# Patient Record
Sex: FEMALE | Race: WHITE | NOT HISPANIC OR LATINO | URBAN - METROPOLITAN AREA
[De-identification: names, ages, dates, MRNs, and addresses within clinical notes are randomized per-mention and may not be internally consistent; named-entity substitution may affect disease eponyms.]

---

## 2020-02-04 ENCOUNTER — PROBLEM (OUTPATIENT)
Dept: URBAN - METROPOLITAN AREA CLINIC 79 | Facility: CLINIC | Age: 55
End: 2020-02-04

## 2020-02-04 DIAGNOSIS — B02.39: ICD-10-CM

## 2020-02-04 PROCEDURE — 92012 INTRM OPH EXAM EST PATIENT: CPT

## 2020-02-04 ASSESSMENT — TONOMETRY: OD_IOP_MMHG: 15

## 2020-02-04 ASSESSMENT — VISUAL ACUITY
OD_CC: 20/25+2
OS_CC: 20/25+2

## 2020-06-29 ENCOUNTER — ESTABLISHED COMPREHENSIVE EXAM (OUTPATIENT)
Dept: URBAN - METROPOLITAN AREA CLINIC 79 | Facility: CLINIC | Age: 55
End: 2020-06-29

## 2020-06-29 DIAGNOSIS — H52.13: ICD-10-CM

## 2020-06-29 DIAGNOSIS — D31.31: ICD-10-CM

## 2020-06-29 DIAGNOSIS — H43.813: ICD-10-CM

## 2020-06-29 DIAGNOSIS — Z97.3: ICD-10-CM

## 2020-06-29 DIAGNOSIS — H25.13: ICD-10-CM

## 2020-06-29 PROCEDURE — 92015 DETERMINE REFRACTIVE STATE: CPT

## 2020-06-29 PROCEDURE — 92250 FUNDUS PHOTOGRAPHY W/I&R: CPT

## 2020-06-29 PROCEDURE — 92310 CONTACT LENS FITTING OU: CPT

## 2020-06-29 PROCEDURE — 92014 COMPRE OPH EXAM EST PT 1/>: CPT

## 2020-06-29 ASSESSMENT — VISUAL ACUITY
OD_CC: 20/20
OD_CC: 20/20
OS_CC: 20/20
OS_CC: 20/20

## 2020-06-29 ASSESSMENT — TONOMETRY
OS_IOP_MMHG: 15
OD_IOP_MMHG: 14

## 2021-05-20 ENCOUNTER — PROBLEM (OUTPATIENT)
Dept: URBAN - METROPOLITAN AREA CLINIC 79 | Facility: CLINIC | Age: 56
End: 2021-05-20

## 2021-05-20 VITALS — HEIGHT: 55 IN

## 2021-05-20 DIAGNOSIS — H11.31: ICD-10-CM

## 2021-05-20 PROCEDURE — 99213 OFFICE O/P EST LOW 20 MIN: CPT

## 2021-05-20 ASSESSMENT — VISUAL ACUITY
OD_CC: 20/20-1
OS_CC: 20/20-1

## 2021-05-20 ASSESSMENT — TONOMETRY
OS_IOP_MMHG: 14
OD_IOP_MMHG: 14

## 2021-06-30 ENCOUNTER — ESTABLISHED COMPREHENSIVE EXAM (OUTPATIENT)
Dept: URBAN - METROPOLITAN AREA CLINIC 79 | Facility: CLINIC | Age: 56
End: 2021-06-30

## 2021-06-30 DIAGNOSIS — Z97.3: ICD-10-CM

## 2021-06-30 DIAGNOSIS — H43.813: ICD-10-CM

## 2021-06-30 DIAGNOSIS — H52.13: ICD-10-CM

## 2021-06-30 DIAGNOSIS — H25.13: ICD-10-CM

## 2021-06-30 PROCEDURE — 92250 FUNDUS PHOTOGRAPHY W/I&R: CPT

## 2021-06-30 PROCEDURE — 99214 OFFICE O/P EST MOD 30 MIN: CPT

## 2021-06-30 PROCEDURE — 92310 CONTACT LENS FITTING OU: CPT

## 2021-06-30 PROCEDURE — 92015 DETERMINE REFRACTIVE STATE: CPT

## 2021-06-30 ASSESSMENT — VISUAL ACUITY
OS_CC: 20/20-1
OD_CC: 20/25
OD_CC: 20/20-1
OS_CC: 20/25

## 2021-06-30 ASSESSMENT — TONOMETRY
OD_IOP_MMHG: 16
OS_IOP_MMHG: 15

## 2022-06-02 ENCOUNTER — ESTABLISHED COMPREHENSIVE EXAM (OUTPATIENT)
Dept: URBAN - METROPOLITAN AREA CLINIC 79 | Facility: CLINIC | Age: 57
End: 2022-06-02

## 2022-06-02 DIAGNOSIS — H04.123: ICD-10-CM

## 2022-06-02 PROCEDURE — 99214 OFFICE O/P EST MOD 30 MIN: CPT

## 2022-06-02 PROCEDURE — 92134 CPTRZ OPH DX IMG PST SGM RTA: CPT | Mod: NC

## 2022-06-02 ASSESSMENT — VISUAL ACUITY
OD_CC: 20/20-1
OS_CC: 20/20

## 2022-06-02 ASSESSMENT — TONOMETRY
OS_IOP_MMHG: 16
OD_IOP_MMHG: 16

## 2022-07-06 ENCOUNTER — ESTABLISHED COMPREHENSIVE EXAM (OUTPATIENT)
Dept: URBAN - METROPOLITAN AREA CLINIC 79 | Facility: CLINIC | Age: 57
End: 2022-07-06

## 2022-07-06 DIAGNOSIS — H43.813: ICD-10-CM

## 2022-07-06 DIAGNOSIS — H52.13: ICD-10-CM

## 2022-07-06 DIAGNOSIS — H04.123: ICD-10-CM

## 2022-07-06 DIAGNOSIS — Z97.3: ICD-10-CM

## 2022-07-06 DIAGNOSIS — D31.31: ICD-10-CM

## 2022-07-06 PROCEDURE — 92250 FUNDUS PHOTOGRAPHY W/I&R: CPT

## 2022-07-06 PROCEDURE — 99214 OFFICE O/P EST MOD 30 MIN: CPT

## 2022-07-06 PROCEDURE — 92015 DETERMINE REFRACTIVE STATE: CPT

## 2022-07-06 PROCEDURE — 92310 CONTACT LENS FITTING OU: CPT

## 2022-07-06 ASSESSMENT — VISUAL ACUITY
OS_CC: 20/20-1
OU_CC: 20/20
OS_CC: 20/25
OD_CC: 20/25
OD_CC: 20/20-2

## 2022-07-06 ASSESSMENT — TONOMETRY
OS_IOP_MMHG: 17
OD_IOP_MMHG: 16

## 2023-10-17 ENCOUNTER — ESTABLISHED COMPREHENSIVE EXAM (OUTPATIENT)
Dept: URBAN - METROPOLITAN AREA CLINIC 79 | Facility: CLINIC | Age: 58
End: 2023-10-17

## 2023-10-17 DIAGNOSIS — Z97.3: ICD-10-CM

## 2023-10-17 DIAGNOSIS — D31.31: ICD-10-CM

## 2023-10-17 DIAGNOSIS — H25.13: ICD-10-CM

## 2023-10-17 PROCEDURE — 92310 CONTACT LENS FITTING OU: CPT

## 2023-10-17 PROCEDURE — 99213 OFFICE O/P EST LOW 20 MIN: CPT

## 2023-10-17 PROCEDURE — 92250 FUNDUS PHOTOGRAPHY W/I&R: CPT

## 2023-10-17 ASSESSMENT — TONOMETRY
OD_IOP_MMHG: 14
OS_IOP_MMHG: 16

## 2023-10-17 ASSESSMENT — VISUAL ACUITY
OS_CC: 20/20-1
OD_CC: 20/20
OS_CC: J2
OD_CC: J5

## 2024-09-18 RX ORDER — ACETAMINOPHEN 500 MG
TABLET ORAL
COMMUNITY

## 2024-09-19 ENCOUNTER — OFFICE VISIT (OUTPATIENT)
Dept: FAMILY MEDICINE CLINIC | Facility: CLINIC | Age: 59
End: 2024-09-19
Payer: COMMERCIAL

## 2024-09-19 VITALS
SYSTOLIC BLOOD PRESSURE: 130 MMHG | HEIGHT: 65 IN | HEART RATE: 80 BPM | DIASTOLIC BLOOD PRESSURE: 82 MMHG | TEMPERATURE: 98 F | WEIGHT: 160.2 LBS | OXYGEN SATURATION: 98 % | BODY MASS INDEX: 26.69 KG/M2 | RESPIRATION RATE: 15 BRPM

## 2024-09-19 DIAGNOSIS — Z00.00 ANNUAL PHYSICAL EXAM: Primary | ICD-10-CM

## 2024-09-19 PROCEDURE — 99386 PREV VISIT NEW AGE 40-64: CPT | Performed by: STUDENT IN AN ORGANIZED HEALTH CARE EDUCATION/TRAINING PROGRAM

## 2024-09-19 NOTE — PROGRESS NOTES
Adult Annual Physical  Name: Za Osullivan      : 1965      MRN: [id number]  Encounter Provider: Gayla Love MD  Encounter Date: 2024   Encounter department: [facility]    Assessment & Plan  Annual physical exam         Immunizations and preventive care screenings were discussed with patient today. Appropriate education was printed on patient's after visit summary.    Counseling:  Exercise: the importance of regular exercise/physical activity was discussed. Recommend exercise 3-5 times per week for at least 30 minutes.        History of Present Illness     Adult Annual Physical:  Patient presents for annual physical.     Depression Screening:  - PHQ-2 Score: 0    Review of Systems   Constitutional:  Negative for appetite change.   HENT:  Negative for congestion.    Respiratory:  Negative for chest tightness and shortness of breath.    Cardiovascular:  Negative for chest pain.   Gastrointestinal:  Negative for abdominal pain.   Genitourinary:  Negative for dysuria.   Neurological:  Negative for dizziness, light-headedness and headaches.     Past Medical History   Past Medical History:   Diagnosis Date    Cancer (HCC)      Past Surgical History:   Procedure Laterality Date    APPENDECTOMY      BREAST SURGERY       Family History   Problem Relation Age of Onset    Heart disease Mother     Thyroid disease Mother     Cancer Father         Stomach cancer    Diabetes Maternal Grandmother     Breast cancer Maternal Aunt     Alcohol abuse Neg Hx     Substance Abuse Neg Hx     Mental illness Neg Hx      Current Outpatient Medications on File Prior to Visit   Medication Sig Dispense Refill    Calcium Carbonate-Vit D-Min (Calcium 1200) 8190-0180 MG-UNIT CHEW       Cranberry 1000 MG CAPS       Multiple Vitamins-Minerals (MULTIVIT/MULTIMINERAL ADULT PO)       Probiotic Product (PROBIOTIC-10 PO)       Psyllium (METAMUCIL 3 IN 1 DAILY FIBER PO)        No current  "facility-administered medications on file prior to visit.     Allergies   Allergen Reactions    Nickel Dermatitis, Itching and Rash      Current Outpatient Medications on File Prior to Visit   Medication Sig Dispense Refill    Calcium Carbonate-Vit D-Min (Calcium 1200) 7718-4797 MG-UNIT CHEW       Cranberry 1000 MG CAPS       Multiple Vitamins-Minerals (MULTIVIT/MULTIMINERAL ADULT PO)       Probiotic Product (PROBIOTIC-10 PO)       Psyllium (METAMUCIL 3 IN 1 DAILY FIBER PO)        No current facility-administered medications on file prior to visit.      Social History     Tobacco Use    Smoking status: Never    Smokeless tobacco: Never   Vaping Use    Vaping status: Never Used   Substance and Sexual Activity    Alcohol use: Yes     Alcohol/week: 7.0 standard drinks of alcohol     Types: 7 Glasses of wine per week    Drug use: Never    Sexual activity: Yes     Partners: Male     Birth control/protection: Post-menopausal       Objective     /82   Pulse 80   Temp 98 °F (36.7 °C)   Resp 15   Ht 5' 5\" (1.651 m)   Wt 72.7 kg (160 lb 3.2 oz)   SpO2 98%   BMI 26.66 kg/m²     Physical Exam  Constitutional:       General: She is not in acute distress.  HENT:      Right Ear: Tympanic membrane, ear canal and external ear normal.      Left Ear: Tympanic membrane, ear canal and external ear normal.      Nose: Nose normal.      Mouth/Throat:      Mouth: Mucous membranes are moist.      Pharynx: Oropharynx is clear.   Eyes:      Extraocular Movements: Extraocular movements intact.      Conjunctiva/sclera: Conjunctivae normal.   Cardiovascular:      Rate and Rhythm: Normal rate.      Pulses: Normal pulses.      Heart sounds: Normal heart sounds.   Pulmonary:      Effort: Pulmonary effort is normal.      Breath sounds: Normal breath sounds.   Abdominal:      Palpations: Abdomen is soft.      Tenderness: There is no abdominal tenderness.   Musculoskeletal:      Right lower leg: No edema.      Left lower leg: No edema. "   Neurological:      Mental Status: She is alert.   Psychiatric:         Mood and Affect: Mood normal.       Administrative Statements   I have spent a total time of 30 minutes in caring for this patient on the day of the visit/encounter including Prognosis, Instructions for management, Importance of tx compliance, Impressions, Documenting in the medical record, and Obtaining or reviewing history  .

## 2025-03-14 ENCOUNTER — TELEPHONE (OUTPATIENT)
Age: 60
End: 2025-03-14

## 2025-03-14 DIAGNOSIS — Z13.220 SCREENING, LIPID: ICD-10-CM

## 2025-03-14 DIAGNOSIS — Z13.0 SCREENING FOR BLOOD DISEASE: Primary | ICD-10-CM

## 2025-03-14 DIAGNOSIS — Z13.228 SCREENING FOR METABOLIC DISORDER: ICD-10-CM

## 2025-03-14 DIAGNOSIS — Z13.29 SCREENING FOR THYROID DISORDER: ICD-10-CM

## 2025-03-14 DIAGNOSIS — Z13.1 SCREENING FOR DIABETES MELLITUS: ICD-10-CM

## 2025-03-14 NOTE — TELEPHONE ENCOUNTER
Patient coming in  3/20 she wanted to know if she should get lab work done before appt.  Put on her chart if she is to do that.    Call her to confirm.  Thanks.